# Patient Record
Sex: MALE | Race: WHITE | NOT HISPANIC OR LATINO | Employment: FULL TIME | URBAN - METROPOLITAN AREA
[De-identification: names, ages, dates, MRNs, and addresses within clinical notes are randomized per-mention and may not be internally consistent; named-entity substitution may affect disease eponyms.]

---

## 2023-11-07 ENCOUNTER — TELEPHONE (OUTPATIENT)
Dept: NEUROSURGERY | Facility: CLINIC | Age: 48
End: 2023-11-07

## 2023-11-07 NOTE — TELEPHONE ENCOUNTER
Called patient in regards to referral placed to Neurosurgery/Feliciano Miguel MD. No recent imaging uploaded to chart. Unable to LVM

## 2023-11-29 ENCOUNTER — OFFICE VISIT (OUTPATIENT)
Dept: NEUROSURGERY | Facility: CLINIC | Age: 48
End: 2023-11-29
Payer: OTHER GOVERNMENT

## 2023-11-29 VITALS
HEART RATE: 66 BPM | SYSTOLIC BLOOD PRESSURE: 140 MMHG | RESPIRATION RATE: 18 BRPM | DIASTOLIC BLOOD PRESSURE: 91 MMHG | HEIGHT: 71 IN | WEIGHT: 231 LBS | BODY MASS INDEX: 32.34 KG/M2

## 2023-11-29 DIAGNOSIS — M54.42 CHRONIC BILATERAL LOW BACK PAIN WITH LEFT-SIDED SCIATICA: ICD-10-CM

## 2023-11-29 DIAGNOSIS — M54.9 DORSALGIA, UNSPECIFIED: Primary | ICD-10-CM

## 2023-11-29 DIAGNOSIS — G89.29 CHRONIC BILATERAL LOW BACK PAIN WITH LEFT-SIDED SCIATICA: ICD-10-CM

## 2023-11-29 PROCEDURE — 99204 PR OFFICE/OUTPT VISIT, NEW, LEVL IV, 45-59 MIN: ICD-10-PCS | Mod: S$PBB,,, | Performed by: PHYSICIAN ASSISTANT

## 2023-11-29 PROCEDURE — 99204 OFFICE O/P NEW MOD 45 MIN: CPT | Mod: S$PBB,,, | Performed by: PHYSICIAN ASSISTANT

## 2023-11-29 RX ORDER — DIAZEPAM 5 MG/1
5 TABLET ORAL ONCE AS NEEDED
Qty: 1 TABLET | Refills: 0 | Status: SHIPPED | OUTPATIENT
Start: 2023-11-29 | End: 2023-11-29

## 2023-11-29 NOTE — PROGRESS NOTES
[facility] Neurosurgery [facility]  Clinic Consult     Consult Requested By: [facility]*  PCP: Lux Flores MD    SUBJECTIVE:     Chief Complaint:   Chief Complaint   Patient presents with    Lumbar Spine Pain (L-Spine)     Patient present to clinic today as back pain. Denies of numbness/tingling       History of Present Illness:  Castillo Alcantara is a 48 y.o. male who presents for evaluation of low back pain. Patient reports onset 6-7 years ago. He was previously diagnosed with a bulging disc via MRI. He reports the pain is present in the low back and radiates into the left lateral thigh into the knee. The leg pain has been present for the last 3 months. He has seen ortho for the knee and received an injection. He describes the pain as aching. He denies numbness/tingling. He denies weakness. Denies bowel/bladder dysfunction. He has attended PT over the years and is currently in PT and receiving dry needling. He reports transient relief with PT. He has not received injections with pain management.     Pertinent and recent history, provider evaluations, imaging and data reviewed in EPIC        History reviewed. No pertinent past medical history.  History reviewed. No pertinent surgical history.  History reviewed. No pertinent family history.  Social History     Tobacco Use    Smoking status: Never    Smokeless tobacco: Never   Substance Use Topics    Alcohol use: Yes     Comment: beer telma      Review of patient's allergies indicates:  No Known Allergies    Current Outpatient Medications:     clindamycin phosphate 1% (CLINDAGEL) 1 % gel, Apply topically 2 (two) times daily., Disp: , Rfl:     Review of Systems:   Constitutional: no fever, chills or night sweats. No changes in weight   Eyes: no visual changes   ENT: no nasal congestion or sore throat   Respiratory: no cough or shortness of breath   Cardiovascular: no chest pain or palpitations   Gastrointestinal: no nausea or  "vomiting   Genitourinary: no hematuria or dysuria   Integument/Breast: no rash or pruritis   Hematologic/Lymphatic: no easy bruising or lymphadenopathy   Musculoskeletal: +back pain   Neurological: no seizures or tremors   Behavioral/Psych: no auditory or visual hallucinations   Endocrine: no heat or cold intolerance         OBJECTIVE:     Vital Signs (Most Recent):  Pulse: 66 (11/29/23 0937)  Resp: 18 (11/29/23 0937)  BP: (!) 140/91 (11/29/23 0937)  Estimated body mass index is 32.22 kg/m² as calculated from the following:    Height as of this encounter: 5' 11" (1.803 m).    Weight as of this encounter: 104.8 kg (231 lb).    Physical Exam:   General: well developed, well nourished, no distress   Neurologic: Alert and oriented. Thought content appropriate. GCS 15.   Head: normocephalic, atraumatic  Eyes: EOMI  Neck: trachea midline, no JVD   Cardiovascular: no LE edema  Pulmonary: normal respirations, no signs of respiratory distress  Abdomen: non-distended  Sensory: intact to light touch throughout  Skin: Skin is warm, dry and intact    Motor Strength: Moves all extremities spontaneously with good tone. No abnormal movements seen.       Deltoids Triceps Biceps Wrist Extension Wrist Flexion Hand  Interossei   Upper: R 5/5 5/5 5/5 5/5 5/5 5/5 5/5    L 5/5 5/5 5/5 5/5 5/5 5/5 5/5     Iliopsoas Quadriceps Knee  Flexion Tibialis  anterior Gastro- cnemius EHL    Lower: R 5/5 5/5 5/5 5/5 5/5 5/5     L 5/5 5/5 5/5 5/5 5/5 5/5      DTR's: 2 + UE, 3+ patellar   Gait: normal       Able to walk on heels & toes          Diagnostic Results:  No imaging    ASSESSMENT/PLAN:     Dorsalgia, unspecified  -     X-Ray Lumbar Spine Ap Lateral w/Flex Ext; Future; Expected date: 11/29/2023  -     MRI Lumbar Spine Without Contrast; Future; Expected date: 11/29/2023  -     Ambulatory referral/consult to Pain Clinic; Future; Expected date: 12/06/2023    Chronic bilateral low back pain with left-sided sciatica  -     Ambulatory " referral/consult to Neurosurgery  -     X-Ray Lumbar Spine Ap Lateral w/Flex Ext; Future; Expected date: 11/29/2023  -     Ambulatory referral/consult to Pain Clinic; Future; Expected date: 12/06/2023    Other orders  -     diazePAM (VALIUM) 5 MG tablet; Take 1 tablet (5 mg total) by mouth once as needed for Anxiety. Take 30 minutes prior to MRI  Dispense: 1 tablet; Refill: 0        Castillo Alcantara is a 48 y.o. male with chronic low back pain and left leg pain. He has responded transiently to PT. We discussed obtaining MRI and dynamic x-rays to further direct treatment. I will review once complete and provide further recommendations.     Patient verbalized understanding of plan. Encouraged to call with any questions or concerns.     This note was partially dictated using voice recognition software, so please excuse any errors that were not corrected.

## 2023-12-06 ENCOUNTER — HOSPITAL ENCOUNTER (OUTPATIENT)
Dept: RADIOLOGY | Facility: HOSPITAL | Age: 48
Discharge: HOME | End: 2023-12-06
Attending: PHYSICIAN ASSISTANT
Payer: OTHER GOVERNMENT

## 2023-12-06 DIAGNOSIS — M54.42 CHRONIC BILATERAL LOW BACK PAIN WITH LEFT-SIDED SCIATICA: ICD-10-CM

## 2023-12-06 DIAGNOSIS — M54.9 DORSALGIA, UNSPECIFIED: ICD-10-CM

## 2023-12-06 DIAGNOSIS — G89.29 CHRONIC BILATERAL LOW BACK PAIN WITH LEFT-SIDED SCIATICA: ICD-10-CM

## 2023-12-06 PROCEDURE — 72110 XR LUMBAR SPINE AP AND LAT WITH FLEX/EXT: ICD-10-PCS | Mod: 26,,, | Performed by: RADIOLOGY

## 2023-12-06 PROCEDURE — 72148 MRI LUMBAR SPINE W/O DYE: CPT | Mod: 26,,, | Performed by: RADIOLOGY

## 2023-12-06 PROCEDURE — 72110 X-RAY EXAM L-2 SPINE 4/>VWS: CPT | Mod: TC,FY,PO

## 2023-12-06 PROCEDURE — 72110 X-RAY EXAM L-2 SPINE 4/>VWS: CPT | Mod: 26,,, | Performed by: RADIOLOGY

## 2023-12-06 PROCEDURE — 72148 MRI LUMBAR SPINE W/O DYE: CPT | Mod: TC,PO

## 2023-12-06 PROCEDURE — 72148 MRI LUMBAR SPINE WITHOUT CONTRAST: ICD-10-PCS | Mod: 26,,, | Performed by: RADIOLOGY

## 2023-12-22 ENCOUNTER — PATIENT MESSAGE (OUTPATIENT)
Dept: NEUROSURGERY | Facility: CLINIC | Age: 48
End: 2023-12-22
Payer: OTHER GOVERNMENT

## 2023-12-22 DIAGNOSIS — N28.1 RENAL CYST, RIGHT: Primary | ICD-10-CM

## 2023-12-26 ENCOUNTER — HOSPITAL ENCOUNTER (OUTPATIENT)
Dept: RADIOLOGY | Facility: HOSPITAL | Age: 48
Discharge: HOME | End: 2023-12-26
Attending: PHYSICIAN ASSISTANT
Payer: OTHER GOVERNMENT

## 2023-12-26 DIAGNOSIS — N28.1 RENAL CYST, RIGHT: ICD-10-CM

## 2023-12-26 PROCEDURE — 76770 US RETROPERITONEAL COMPLETE: ICD-10-PCS | Mod: 26,,, | Performed by: RADIOLOGY

## 2023-12-26 PROCEDURE — 76770 US EXAM ABDO BACK WALL COMP: CPT | Mod: 26,,, | Performed by: RADIOLOGY

## 2023-12-26 PROCEDURE — 76770 US EXAM ABDO BACK WALL COMP: CPT | Mod: TC,PO

## 2024-01-02 ENCOUNTER — PATIENT MESSAGE (OUTPATIENT)
Dept: NEUROSURGERY | Facility: CLINIC | Age: 49
End: 2024-01-02
Payer: OTHER GOVERNMENT

## 2024-03-05 ENCOUNTER — OFFICE VISIT (OUTPATIENT)
Dept: PAIN MEDICINE | Facility: CLINIC | Age: 49
End: 2024-03-05
Payer: OTHER GOVERNMENT

## 2024-03-05 VITALS
WEIGHT: 230.69 LBS | DIASTOLIC BLOOD PRESSURE: 78 MMHG | SYSTOLIC BLOOD PRESSURE: 127 MMHG | BODY MASS INDEX: 32.3 KG/M2 | HEIGHT: 71 IN | HEART RATE: 50 BPM

## 2024-03-05 DIAGNOSIS — G89.29 CHRONIC BILATERAL LOW BACK PAIN WITHOUT SCIATICA: ICD-10-CM

## 2024-03-05 DIAGNOSIS — M47.816 LUMBAR SPONDYLOSIS: Primary | ICD-10-CM

## 2024-03-05 DIAGNOSIS — M54.50 CHRONIC BILATERAL LOW BACK PAIN WITHOUT SCIATICA: ICD-10-CM

## 2024-03-05 DIAGNOSIS — M54.9 DORSALGIA, UNSPECIFIED: ICD-10-CM

## 2024-03-05 PROCEDURE — 99204 OFFICE O/P NEW MOD 45 MIN: CPT | Mod: S$PBB,,, | Performed by: ANESTHESIOLOGY

## 2024-03-05 PROCEDURE — 99999 PR PBB SHADOW E&M-EST. PATIENT-LVL III: CPT | Mod: PBBFAC,,, | Performed by: ANESTHESIOLOGY

## 2024-03-05 PROCEDURE — 99213 OFFICE O/P EST LOW 20 MIN: CPT | Mod: PBBFAC,PO | Performed by: ANESTHESIOLOGY

## 2024-03-05 NOTE — PROGRESS NOTES
"[facility] New Patient Evaluation      Referred by: Whitney Osman    PCP:     CC:   Chief Complaint   Patient presents with    Low-back Pain     Bilateral low back pain          3/5/2024     7:57 AM   Last 3 PDI Scores   Pain Disability Index (PDI) 27         HPI:   Castillo Alcantara is a 48 y.o. male patient who has no past medical history on file. He presents with back pain.  He has had chronic back pain for the past 6 years.  Today he rates his pain as 5/10, constant, aching, throbbing, grabbing, tight in the axial lumbar spine.  He denies any radiating or radicular pain.  His pain is worse with standing, lifting, back extension and relieved with rest and heat.  He denies any numbness or weakness.      Pain Intervention History:      Past Spine Surgical History:      Past and current medications:  Antineuropathics:  NSAIDs: ibuprofen  Physical therapy: yes, completed   Antidepressants:  Muscle relaxers:  Opioids:  Antiplatelets/Anticoagulants:    History:    Current Outpatient Medications:     clindamycin phosphate 1% (CLINDAGEL) 1 % gel, Apply topically 2 (two) times daily., Disp: , Rfl:     diazePAM (VALIUM) 5 MG tablet, Take 1 tablet (5 mg total) by mouth once as needed for Anxiety. Take 30 minutes prior to MRI, Disp: 1 tablet, Rfl: 0    No past medical history on file.    No past surgical history on file.    No family history on file.    Social History     Socioeconomic History    Marital status:    Tobacco Use    Smoking status: Never    Smokeless tobacco: Never   Substance and Sexual Activity    Alcohol use: Yes     Comment: beer rarely       Review of patient's allergies indicates:  No Known Allergies    Review of Systems:  12 point review of systems is negative.    Physical Exam:  Vitals:    03/05/24 0757   BP: 127/78   Pulse: (!) 50   Weight: 104.6 kg (230 lb 11.4 oz)   Height: 5' 11" (1.803 m)   PainSc:   6   PainLoc: Back     Body mass index is 32.18 kg/m².    Gen: NAD  Psych: mood " "appropriate for given condition  HEENT: eyes anicteric   CV: RRR  HEENT: anicteric   Respiratory: non-labored, no signs of respiratory distress  Abd: non-distended  Skin: warm, dry and intact.  Gait: No antalgic gait.     Reproducible pain with lumbar axial facet loading    Sensory:  Intact and symmetrical to light touch in L1-S1 dermatomes bilaterally.    Motor:     Right Left   L2/3 Iliacus Hip flexion  5  5   L3/4 Qudratus Femoris Knee Extension  5  5   L4/5 Tib Anterior Ankle Dorsiflexion   5  5   L5/S1 Extensor Hallicus Longus Great toe extension  5  5   S1/S2 Gastroc/Soleus Plantar Flexion  5  5      Right Left                  Patellar DTR 2+ 2+   Achilles DTR 0 0                      Labs:  No results found for: "LABA1C", "HGBA1C"    Lab Results   Component Value Date    WBC 5.18 09/11/2023    HGB 16.3 09/11/2023    HCT 49.7 09/11/2023    MCV 97 09/11/2023     09/11/2023       Imaging:  MRI lumbar spine 12/6/23  FINDINGS:  Vertebral column: The lumbar vertebral bodies maintain normal height.  There is no fracture.  Alignment is normal.  There is no significant disc space narrowing.  There is mild endplate osteophyte formation, most apparent at the L1-2 level.  There is a small hemangioma in the L3 vertebral body.  Otherwise, baseline marrow signal is normal.     Spinal canal, conus, epidural space: The spinal canal is developmentally normal.  The conus terminates at the level of L1-2 and is normal in contour and signal intensity.  There is no abnormal epidural mass or fluid collection.     Findings by level:     On the sagittal images, the T11-12 level is normal.     T12-L1: Unremarkable  L1-2: There is no spinal canal or significant foraminal stenosis.  L2-3: There is no spinal canal or significant foraminal stenosis.  L3-4: There is a minimal disc bulge.  There is no spinal canal or significant foraminal stenosis.  L4-5: There is a mild disc bulge.  There is mild facet joint arthropathy.  There is " no spinal canal or significant foraminal stenosis.  L5-S1: There is a minimal disc bulge and mild facet joint arthropathy.  There is no spinal canal or significant foraminal stenosis.     Soft tissues, other: The prevertebral soft tissues are normal.  The aorta is normal in caliber.  Also, there is a suspected extrarenal pelvis bilaterally.  This is incompletely evaluated.  There is also a suspected right renal cyst which is only partially included on the axial images and therefore incompletely evaluated.    Assessment:   Problem List Items Addressed This Visit    None  Visit Diagnoses       Lumbar spondylosis    -  Primary    Chronic bilateral low back pain without sciatica        Dorsalgia, unspecified                  Castillo Alcantara is a 48 y.o. male patient who has no past medical history on file. He presents with back pain.  He has had chronic back pain for the past 6 years.  Today he rates his pain as 5/10, constant, aching, throbbing, grabbing, tight in the axial lumbar spine.  He denies any radiating or radicular pain.  His pain is worse with standing, lifting, back extension and relieved with rest and heat.  He denies any numbness or weakness.    - on exam he has full strength in his lower extremities and intact sensation to light touch bilateral L2-S1.  He has reproducible pain with lumbar axial facet loading  - I independently reviewed his lumbar MRI and he has multilevel bilateral facet arthropathy worse at L4-5 and L5-S1  - over the past 12 months he has participate in formal physical therapy and maintains PT directed home exercise program as well as takes ibuprofen on an as needed basis for pain  - he continues to feel like his lower back pain is limiting his mobility and interfering with the quality of his life  - I think his pain is secondary to lumbar spondylosis  - we discussed diagnostic lumbar medial branch blocks.  He is interested in this however he will reach out to our office when he is  able to do it as he is a  and has a busy schedule in the next few months.      : Not applicable    Maycol Santos M.D.  Interventional Pain Medicine / Anesthesiology    This note was completed with dictation software and grammatical errors may exist.

## 2024-11-04 ENCOUNTER — PATIENT MESSAGE (OUTPATIENT)
Dept: RESEARCH | Facility: HOSPITAL | Age: 49
End: 2024-11-04